# Patient Record
Sex: FEMALE | Race: WHITE | NOT HISPANIC OR LATINO | Employment: FULL TIME | URBAN - METROPOLITAN AREA
[De-identification: names, ages, dates, MRNs, and addresses within clinical notes are randomized per-mention and may not be internally consistent; named-entity substitution may affect disease eponyms.]

---

## 2017-03-02 ENCOUNTER — PATIENT MESSAGE (OUTPATIENT)
Dept: OPHTHALMOLOGY | Facility: CLINIC | Age: 57
End: 2017-03-02

## 2017-03-13 ENCOUNTER — OFFICE VISIT (OUTPATIENT)
Dept: OPHTHALMOLOGY | Facility: CLINIC | Age: 57
End: 2017-03-13
Payer: COMMERCIAL

## 2017-03-13 DIAGNOSIS — Z46.0 CONTACT LENS/GLASSES FITTING: Primary | ICD-10-CM

## 2017-03-13 PROCEDURE — 99499 UNLISTED E&M SERVICE: CPT | Mod: S$GLB,,, | Performed by: OPTOMETRIST

## 2017-03-13 PROCEDURE — 99999 PR PBB SHADOW E&M-EST. PATIENT-LVL III: CPT | Mod: PBBFAC,,, | Performed by: OPTOMETRIST

## 2017-03-13 RX ORDER — VITAMIN B COMPLEX
1 CAPSULE ORAL
COMMUNITY
Start: 2016-09-27 | End: 2017-03-13

## 2017-03-13 RX ORDER — BENZONATATE 100 MG/1
CAPSULE ORAL
COMMUNITY
Start: 2017-02-15 | End: 2017-03-13

## 2017-03-13 NOTE — PROGRESS NOTES
HPI     Problems with glasses rx    Additional comments: getting dizzy and having neck aches           Comments   Pt got her new progressive glasses in October. She has been trying them   out and they are making her dizzy. She has to move her head around to see   near, which hurts her neck. Here to have prescription checked.  Cannot   wear them longer than 20 minutes. Work well for driving at night.   Headaches with computer work    Dm SINCE 2005  NO DBR  Pciol ou  yag od 5/21/14  Myopia os       Last edited by Annmarie Justin MA on 3/13/2017 10:37 AM. (History)            Assessment /Plan     For exam results, see Encounter Report.    Contact lens/glasses fitting      Prescription of glasses is good, but there are problems with the PAL's positioning in the frame.  Sent to optical.

## 2017-03-13 NOTE — MR AVS SNAPSHOT
[facility] - Ophthalmology  [address] [facility] [address] [place] [address]  Phone: [phone]  Fax: [phone]                  Courtney Latif   3/13/2017 10:30 AM   Office Visit    Description:  Female : 1960   Provider:  Tasia Ashton OD   Department:  [facility]           Reason for Visit     Problems with glasses rx           Diagnoses this Visit        Comments    Contact lens/glasses fitting    -  Primary            To Do List           Goals (5 Years of Data)     None      [facility]     [facility] On Call Nurse Care Line -  Assistance  Registered nurses in the [facility] On Call Center provide clinical advisement, health education, appointment booking, and other advisory services.  Call for this free service at [phone].             Medications           Message regarding Medications     Verify the changes and/or additions to your medication regime listed below are the same as discussed with your clinician today.  If any of these changes or additions are incorrect, please notify your healthcare provider.        STOP taking these medications     b complex vitamins capsule Take 1 capsule by mouth.    benzonatate (TESSALON) 100 MG capsule 2 by mouth every 8 hours as needed for moderate to severe cough    cefdinir (OMNICEF) 300 MG capsule     ciprofloxacin (CIPRO) 500 MG tablet     meloxicam (MOBIC) 15 MG tablet            Verify that the below list of medications is an accurate representation of the medications you are currently taking.  If none reported, the list may be blank. If incorrect, please contact your healthcare provider. Carry this list with you in case of emergency.           Current Medications     amlodipine-benazepril 5-20 mg (LOTREL) 5-20 mg per capsule Take 1 capsule by mouth.    aspirin (ECOTRIN) 325 MG EC tablet Take 325 mg by mouth.    cyclobenzaprine (FLEXERIL) 10 MG tablet Take 10 mg by mouth.    fluoxetine (PROZAC) 20 MG capsule Take 20 mg by mouth.     hydrochlorothiazide (HYDRODIURIL) 25 MG tablet     lorazepam (ATIVAN) 0.5 MG tablet Take 0.5 mg by mouth.    metformin (GLUCOPHAGE-XR) 500 MG 24 hr tablet     nebivolol (BYSTOLIC) 5 MG Tab Take 5 mg by mouth.    nitrofurantoin (MACRODANTIN) 50 MG capsule     polyethylene glycol (MIRALAX) 17 gram/dose powder Take 17 g by mouth.    pravastatin (PRAVACHOL) 10 MG tablet     rabeprazole (ACIPHEX) 20 mg tablet Take 20 mg by mouth.    trazodone (DESYREL) 50 MG tablet     valacyclovir (VALTREX) 1000 MG tablet            Clinical Reference Information           Allergies as of 3/13/2017     Atorvastatin    Rosuvastatin      Immunizations Administered on Date of Encounter - 3/13/2017     None      Language Assistance Services     ATTENTION: Language assistance services are available, free of charge. Please call [phone].      ATENCIÓN: Si habla karyn, tiene a beckford disposición servicios gratuitos de asistencia lingüística. Llame al [phone].     CHÚ Ý: N?u b?n nói Ti?ng Vi?t, có các d?ch v? h? tr? ngôn ng? mi?n phí dành cho b?n. G?i s? [phone].         [facility] complies with applicable Federal civil rights laws and does not discriminate on the basis of race, color, national origin, age, disability, or sex.

## 2017-07-03 ENCOUNTER — OFFICE VISIT (OUTPATIENT)
Dept: OPHTHALMOLOGY | Facility: CLINIC | Age: 57
End: 2017-07-03
Payer: COMMERCIAL

## 2017-07-03 DIAGNOSIS — E11.9 TYPE 2 DIABETES MELLITUS WITHOUT COMPLICATION, WITHOUT LONG-TERM CURRENT USE OF INSULIN: Primary | ICD-10-CM

## 2017-07-03 DIAGNOSIS — Z96.1 LENS REPLACED BY OTHER MEANS: ICD-10-CM

## 2017-07-03 DIAGNOSIS — H35.54 PATTERN DYSTROPHY OF MACULA: ICD-10-CM

## 2017-07-03 PROBLEM — M54.9 CHRONIC BACK PAIN: Status: ACTIVE | Noted: 2017-07-03

## 2017-07-03 PROBLEM — G89.29 CHRONIC BACK PAIN: Status: ACTIVE | Noted: 2017-07-03

## 2017-07-03 PROCEDURE — 92014 COMPRE OPH EXAM EST PT 1/>: CPT | Mod: S$GLB,,, | Performed by: OPHTHALMOLOGY

## 2017-07-03 PROCEDURE — 99999 PR PBB SHADOW E&M-EST. PATIENT-LVL II: CPT | Mod: PBBFAC,,, | Performed by: OPHTHALMOLOGY

## 2017-07-03 RX ORDER — SPIRONOLACTONE 100 MG/1
TABLET, FILM COATED ORAL
COMMUNITY
Start: 2017-05-24 | End: 2018-11-06

## 2017-07-03 NOTE — PROGRESS NOTES
===============================  07/03/2017   Courtney Latif,   56 y.o. female   Last visit [facility]: :6/28/2016   Last visit eye dept. 3/13/2017  [facility]:  Uncorrected distance visual acuity was 20/20 in the right eye and not recorded in the left eye. Uncorrected near visual acuity was not recorded in the right eye and J1 in the left eye.  Tonometry     Tonometry (Applanation, 9:27 AM)       Right Left    Pressure 14 14               Not recorded        Manifest Refraction     Manifest Refraction       Sphere Cylinder Axis Dist [facility]    Right -0.50 +0.50 090 20/20    Left -2.25 +0.50 025 20/20              Chief Complaint   Patient presents with    Diabetic Eye Exam        HPI     Pt here for yearly DM exam. Pt states that she notices a bit of waviness   in her right eye, like if someone were waving a net in front of her eye.   No pain or discomfort. Blood sugar stable. Latest A1C 6.5.     Dm SINCE 2005  NO DBR  Pciol ou  yag od 5/21/14  Myopia os    Last edited by Larry Guardado, Patient Care Assistant on 7/3/2017  9:16   AM. (History)      Read Studies:   Vitals  ________________  7/3/2017  Problem List Items Addressed This Visit        Eye/Vision problems    Type 2 diabetes mellitus without complication - Primary    Lens replaced by other means    Pattern dystrophy of macula      Other Visit Diagnoses    None.       DM, no BDR  PCIOL OU looks great  Pattern dystrophy stable  rtc 1 year.  .       ===========================

## 2018-11-06 ENCOUNTER — OFFICE VISIT (OUTPATIENT)
Dept: OPHTHALMOLOGY | Facility: CLINIC | Age: 58
End: 2018-11-06
Payer: COMMERCIAL

## 2018-11-06 DIAGNOSIS — H35.54 PATTERN DYSTROPHY OF MACULA: ICD-10-CM

## 2018-11-06 DIAGNOSIS — E11.9 TYPE 2 DIABETES MELLITUS WITHOUT COMPLICATION, WITHOUT LONG-TERM CURRENT USE OF INSULIN: Primary | ICD-10-CM

## 2018-11-06 DIAGNOSIS — Z96.1 PSEUDOPHAKIA: ICD-10-CM

## 2018-11-06 PROCEDURE — 99999 PR PBB SHADOW E&M-EST. PATIENT-LVL II: CPT | Mod: PBBFAC,,, | Performed by: OPHTHALMOLOGY

## 2018-11-06 PROCEDURE — 92014 COMPRE OPH EXAM EST PT 1/>: CPT | Mod: S$GLB,,, | Performed by: OPHTHALMOLOGY

## 2018-11-06 RX ORDER — CALCIUM CARBONATE/VITAMIN D3 600MG-5MCG
TABLET ORAL
COMMUNITY

## 2018-11-06 RX ORDER — CLOTRIMAZOLE AND BETAMETHASONE DIPROPIONATE 10; .64 MG/G; MG/G
CREAM TOPICAL
COMMUNITY
Start: 2015-10-20 | End: 2021-01-05

## 2018-11-06 NOTE — PROGRESS NOTES
===============================  11/06/2018   Courtney Latif,   58 y.o. female   Last visit [facility]: :7/3/2017   Last visit eye dept. Visit date not found  [facility]:  Uncorrected distance visual acuity was 20/20 in the right eye and not recorded in the left eye. Uncorrected near visual acuity was not recorded in the right eye and J1 in the left eye.  Tonometry     Tonometry (Applanation, 8:47 AM)       Right Left    Pressure 15 16               Not recorded         Not recorded        Chief Complaint   Patient presents with    Diabetes     yearly diabetic exam        HPI     Diabetes      Additional comments: yearly diabetic exam              Comments     Dm SINCE 2005  NO DBR  Pciol ou  yag od 5/21/14  Myopia os          Last edited by Daria Noe on 11/6/2018  8:38 AM. (History)          ________________  11/6/2018  Problem List Items Addressed This Visit        Eye/Vision problems    Type 2 diabetes mellitus without complication - Primary  No BDR    Pseudophakia    Pattern dystrophy of macula    Overview     --lacquer crack maculopathy               .rtc 1year       ===========================

## 2019-12-06 ENCOUNTER — OFFICE VISIT (OUTPATIENT)
Dept: OPHTHALMOLOGY | Facility: CLINIC | Age: 59
End: 2019-12-06
Payer: COMMERCIAL

## 2019-12-06 DIAGNOSIS — E11.9 TYPE 2 DIABETES MELLITUS WITHOUT COMPLICATION, WITHOUT LONG-TERM CURRENT USE OF INSULIN: Primary | ICD-10-CM

## 2019-12-06 DIAGNOSIS — H35.54 PATTERN DYSTROPHY OF MACULA: ICD-10-CM

## 2019-12-06 PROCEDURE — 99999 PR PBB SHADOW E&M-EST. PATIENT-LVL II: CPT | Mod: PBBFAC,,, | Performed by: OPHTHALMOLOGY

## 2019-12-06 PROCEDURE — 99999 PR PBB SHADOW E&M-EST. PATIENT-LVL II: ICD-10-PCS | Mod: PBBFAC,,, | Performed by: OPHTHALMOLOGY

## 2019-12-06 PROCEDURE — 92014 PR EYE EXAM, EST PATIENT,COMPREHESV: ICD-10-PCS | Mod: S$GLB,,, | Performed by: OPHTHALMOLOGY

## 2019-12-06 PROCEDURE — 92014 COMPRE OPH EXAM EST PT 1/>: CPT | Mod: S$GLB,,, | Performed by: OPHTHALMOLOGY

## 2019-12-06 NOTE — PROGRESS NOTES
===============================  Courtney Latif,   59 y.o. female   Last visit [facility]: :11/6/2018   Last visit eye dept. Visit date not found  [facility]:  Uncorrected distance visual acuity was 20/20 in the right eye and not recorded in the left eye. Uncorrected near visual acuity was not recorded in the right eye and J1 in the left eye.  Tonometry     Tonometry (Applanation, 2:26 PM)       Right Left    Pressure 17 19               Not recorded         Not recorded         Not recorded        Chief Complaint   Patient presents with    Diabetic Eye Exam     here for dm eye exam, no c.o    lacquer crack          ________________  12/6/2019  HPI     Diabetic Eye Exam      Additional comments: here for dm eye exam, no c.o              Comments       Dm SINCE 2005  NO DBR  Pciol ou  yag od 5/21/14  Myopia os with lacquer crack          Last edited by NOEMY Walton on 12/6/2019  2:24 PM. (History)      Problem List Items Addressed This Visit        Eye/Vision problems    Type 2 diabetes mellitus without complication - Primary    Pattern dystrophy of macula    Overview     --lacquer crack maculopathy               .stable  rtc 1 year       ===========================

## 2021-01-05 ENCOUNTER — OFFICE VISIT (OUTPATIENT)
Dept: OPHTHALMOLOGY | Facility: CLINIC | Age: 61
End: 2021-01-05
Payer: COMMERCIAL

## 2021-01-05 DIAGNOSIS — H35.54 PATTERN DYSTROPHY OF MACULA: ICD-10-CM

## 2021-01-05 DIAGNOSIS — H43.811 POSTERIOR VITREOUS DETACHMENT OF RIGHT EYE: ICD-10-CM

## 2021-01-05 DIAGNOSIS — E11.9 TYPE 2 DIABETES MELLITUS WITHOUT COMPLICATION, WITHOUT LONG-TERM CURRENT USE OF INSULIN: Primary | ICD-10-CM

## 2021-01-05 PROCEDURE — 92134 CPTRZ OPH DX IMG PST SGM RTA: CPT | Mod: S$GLB,,, | Performed by: OPHTHALMOLOGY

## 2021-01-05 PROCEDURE — 92014 PR EYE EXAM, EST PATIENT,COMPREHESV: ICD-10-PCS | Mod: S$GLB,,, | Performed by: OPHTHALMOLOGY

## 2021-01-05 PROCEDURE — 99999 PR PBB SHADOW E&M-EST. PATIENT-LVL III: CPT | Mod: PBBFAC,,, | Performed by: OPHTHALMOLOGY

## 2021-01-05 PROCEDURE — 92134 POSTERIOR SEGMENT OCT RETINA (OCULAR COHERENCE TOMOGRAPHY)-BOTH EYES: ICD-10-PCS | Mod: S$GLB,,, | Performed by: OPHTHALMOLOGY

## 2021-01-05 PROCEDURE — 92014 COMPRE OPH EXAM EST PT 1/>: CPT | Mod: S$GLB,,, | Performed by: OPHTHALMOLOGY

## 2021-01-05 PROCEDURE — 99999 PR PBB SHADOW E&M-EST. PATIENT-LVL III: ICD-10-PCS | Mod: PBBFAC,,, | Performed by: OPHTHALMOLOGY

## 2021-01-05 RX ORDER — MELOXICAM 15 MG/1
TABLET ORAL
COMMUNITY
Start: 2020-12-27

## 2021-01-05 RX ORDER — NITROFURANTOIN 25; 75 MG/1; MG/1
100 CAPSULE ORAL
COMMUNITY
End: 2023-06-09

## 2021-01-05 RX ORDER — ESTRADIOL 0.1 MG/G
CREAM VAGINAL
COMMUNITY
Start: 2020-12-28

## 2021-01-05 RX ORDER — GABAPENTIN 300 MG/1
CAPSULE ORAL
COMMUNITY
Start: 2020-12-10

## 2021-05-06 ENCOUNTER — PATIENT MESSAGE (OUTPATIENT)
Dept: RESEARCH | Facility: HOSPITAL | Age: 61
End: 2021-05-06

## 2022-06-06 ENCOUNTER — OFFICE VISIT (OUTPATIENT)
Dept: OPHTHALMOLOGY | Facility: CLINIC | Age: 62
End: 2022-06-06
Payer: COMMERCIAL

## 2022-06-06 DIAGNOSIS — H35.54 PATTERN DYSTROPHY OF MACULA: ICD-10-CM

## 2022-06-06 DIAGNOSIS — E11.3291 TYPE 2 DIABETES MELLITUS WITH RIGHT EYE AFFECTED BY MILD NONPROLIFERATIVE RETINOPATHY WITHOUT MACULAR EDEMA, WITHOUT LONG-TERM CURRENT USE OF INSULIN: Primary | ICD-10-CM

## 2022-06-06 DIAGNOSIS — Z96.1 PSEUDOPHAKIA: ICD-10-CM

## 2022-06-06 DIAGNOSIS — E11.9 TYPE 2 DIABETES MELLITUS WITHOUT COMPLICATION, WITHOUT LONG-TERM CURRENT USE OF INSULIN: ICD-10-CM

## 2022-06-06 PROCEDURE — 99999 PR PBB SHADOW E&M-EST. PATIENT-LVL II: ICD-10-PCS | Mod: PBBFAC,,, | Performed by: OPHTHALMOLOGY

## 2022-06-06 PROCEDURE — 92014 PR EYE EXAM, EST PATIENT,COMPREHESV: ICD-10-PCS | Mod: S$GLB,,, | Performed by: OPHTHALMOLOGY

## 2022-06-06 PROCEDURE — 92134 CPTRZ OPH DX IMG PST SGM RTA: CPT | Mod: S$GLB,,, | Performed by: OPHTHALMOLOGY

## 2022-06-06 PROCEDURE — 92134 POSTERIOR SEGMENT OCT RETINA (OCULAR COHERENCE TOMOGRAPHY)-BOTH EYES: ICD-10-PCS | Mod: S$GLB,,, | Performed by: OPHTHALMOLOGY

## 2022-06-06 PROCEDURE — 92014 COMPRE OPH EXAM EST PT 1/>: CPT | Mod: S$GLB,,, | Performed by: OPHTHALMOLOGY

## 2022-06-06 PROCEDURE — 99999 PR PBB SHADOW E&M-EST. PATIENT-LVL II: CPT | Mod: PBBFAC,,, | Performed by: OPHTHALMOLOGY

## 2022-06-06 RX ORDER — LEVOTHYROXINE SODIUM 25 UG/1
25 TABLET ORAL DAILY
COMMUNITY
Start: 2022-06-04

## 2023-06-09 ENCOUNTER — OFFICE VISIT (OUTPATIENT)
Dept: OPHTHALMOLOGY | Facility: CLINIC | Age: 63
End: 2023-06-09
Payer: COMMERCIAL

## 2023-06-09 DIAGNOSIS — E11.3291 TYPE 2 DIABETES MELLITUS WITH RIGHT EYE AFFECTED BY MILD NONPROLIFERATIVE RETINOPATHY WITHOUT MACULAR EDEMA, WITHOUT LONG-TERM CURRENT USE OF INSULIN: Primary | ICD-10-CM

## 2023-06-09 DIAGNOSIS — Z96.1 PSEUDOPHAKIA: ICD-10-CM

## 2023-06-09 DIAGNOSIS — E11.9 TYPE 2 DIABETES MELLITUS WITHOUT COMPLICATION, WITHOUT LONG-TERM CURRENT USE OF INSULIN: ICD-10-CM

## 2023-06-09 PROCEDURE — 99999 PR PBB SHADOW E&M-EST. PATIENT-LVL III: CPT | Mod: PBBFAC,,, | Performed by: OPHTHALMOLOGY

## 2023-06-09 PROCEDURE — 92134 CPTRZ OPH DX IMG PST SGM RTA: CPT | Mod: S$GLB,,, | Performed by: OPHTHALMOLOGY

## 2023-06-09 PROCEDURE — 92014 COMPRE OPH EXAM EST PT 1/>: CPT | Mod: S$GLB,,, | Performed by: OPHTHALMOLOGY

## 2023-06-09 PROCEDURE — 92014 PR EYE EXAM, EST PATIENT,COMPREHESV: ICD-10-PCS | Mod: S$GLB,,, | Performed by: OPHTHALMOLOGY

## 2023-06-09 PROCEDURE — 92134 POSTERIOR SEGMENT OCT RETINA (OCULAR COHERENCE TOMOGRAPHY)-BOTH EYES: ICD-10-PCS | Mod: S$GLB,,, | Performed by: OPHTHALMOLOGY

## 2023-06-09 PROCEDURE — 99999 PR PBB SHADOW E&M-EST. PATIENT-LVL III: ICD-10-PCS | Mod: PBBFAC,,, | Performed by: OPHTHALMOLOGY

## 2023-06-09 NOTE — PROGRESS NOTES
===============================  Date today is 6/9/2023  Courtney Latif is a 62 y.o. female  Last visit [facility]: :6/6/2022   Last visit eye dept. Visit date not found    Uncorrected distance visual acuity was 20/20 in the right eye and not recorded in the left eye. Uncorrected near visual acuity was not recorded in the right eye and J1 in the left eye.  Tonometry       Tonometry (Applanation, 11:00 AM)         Right Left    Pressure 17 17                  Not recorded       Not recorded       Not recorded       Chief Complaint   Patient presents with    Diabetic Eye Exam     Pt here for annual DM exam. No pain or discomfort. VA stable.      HPI     Diabetic Eye Exam     Additional comments: Pt here for annual DM exam. No pain or discomfort.   VA stable.            Comments    Dm SINCE 2005  NO DBR  Pciol ou  yag od 5/21/14  Myopia os with lacquer crack            Last edited by Larry Guardado MA on 6/9/2023 10:57 AM.      Problem List Items Addressed This Visit          Eye/Vision problems    Type 2 diabetes mellitus without complication    Relevant Orders    Posterior Segment OCT Retina-Both eyes (Completed)    Pseudophakia     Other Visit Diagnoses       Type 2 diabetes mellitus with right eye affected by mild nonproliferative retinopathy without macular edema, without long-term current use of insulin    -  Primary    Relevant Orders    Posterior Segment OCT Retina-Both eyes (Completed)          Instructed to call 24/7 for any worsening of vision, visual distortion or pain.  Check OU independently daily.    Gave my office and personal cell phone number.  ________________  6/9/2023 today  Courtney Latif      DM no active retinopathy  OCT stable  PCIOL OU with YAG OD  No macular degeneration   No glaucoma    RTC 1 year  Instructed to call 24/7 for any worsening of vision or symptoms. Check OU daily.   Gave my office and cell phone number.    =============================

## 2024-06-20 NOTE — PROGRESS NOTES
===============================  Date today is 6/6/2022  Courtney Latif is a 61 y.o. female  Last visit [facility]: :1/5/2021   Last visit eye dept. Visit date not found    Uncorrected distance visual acuity was 20/20 in the right eye and not recorded in the left eye. Uncorrected near visual acuity was not recorded in the right eye and J1 in the left eye.  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 Check OU daily.   Gave my office and cell phone number.      .      ===============================       Update History & Physical    The patient's History and Physical of 6/3/24 was reviewed with the patient and I examined the patient. There was no change. The surgical site was confirmed by the patient and me.     Plan: The risks, benefits, expected outcome, and alternative to the recommended procedure have been discussed with the patient. Patient understands and wants to proceed with the procedure.     Electronically signed by Messi Mccauley MD on 6/20/2024 at 12:19 PM

## 2024-09-30 ENCOUNTER — OFFICE VISIT (OUTPATIENT)
Dept: OPHTHALMOLOGY | Facility: CLINIC | Age: 64
End: 2024-09-30

## 2024-09-30 DIAGNOSIS — Z96.1 PSEUDOPHAKIA: ICD-10-CM

## 2024-09-30 DIAGNOSIS — E11.3291 TYPE 2 DIABETES MELLITUS WITH RIGHT EYE AFFECTED BY MILD NONPROLIFERATIVE RETINOPATHY WITHOUT MACULAR EDEMA, WITHOUT LONG-TERM CURRENT USE OF INSULIN: Primary | ICD-10-CM

## 2024-09-30 PROCEDURE — 99213 OFFICE O/P EST LOW 20 MIN: CPT | Mod: PBBFAC,25 | Performed by: OPHTHALMOLOGY

## 2024-09-30 PROCEDURE — 99999 PR PBB SHADOW E&M-EST. PATIENT-LVL III: CPT | Mod: PBBFAC,,, | Performed by: OPHTHALMOLOGY

## 2024-09-30 PROCEDURE — 92012 INTRM OPH EXAM EST PATIENT: CPT | Mod: S$PBB,,, | Performed by: OPHTHALMOLOGY

## 2024-09-30 PROCEDURE — 92134 CPTRZ OPH DX IMG PST SGM RTA: CPT | Mod: PBBFAC | Performed by: OPHTHALMOLOGY

## 2024-09-30 RX ORDER — TRAMADOL HYDROCHLORIDE 50 MG/1
50 TABLET ORAL EVERY 8 HOURS PRN
COMMUNITY
Start: 2024-02-05

## 2024-09-30 RX ORDER — FERROUS SULFATE 325(65) MG
325 TABLET ORAL DAILY
COMMUNITY

## 2024-09-30 NOTE — PROGRESS NOTES
===============================  Date today is 9/30/2024  Courtney Latif is a 64 y.o. female  Last visit [facility]: :Visit date not found   Last visit eye dept. Visit date not found    Uncorrected distance visual acuity was 20/20 in the right eye and -- in the left eye. Uncorrected near visual acuity was not recorded in the right eye and J1 in the left eye.  Tonometry       Tonometry (Applanation, 11:04 AM)         Right Left    Pressure 15 14                  Not recorded       Not recorded       Not recorded       Chief Complaint   Patient presents with    Diabetic Eye Exam     HPI    Dm SINCE 2005  NO DBR  Pciol ou  yag od 5/21/14  Myopia os with lacquer crack     Last edited by Na Barber on 9/30/2024 10:50 AM.      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  Check OU independently daily.    Gave my office and personal cell phone number.  ________________  9/30/2024 today  Courtney Latif    :  DM stable retinopathy  OCT no change  Pciol post YAG OD  Sharp disc OU  Macula looks good  OS lacquer crack maculopathy    RTC 1 year  Instructed to call 24/7 for any worsening of vision or symptoms. Check OU daily.   Gave my office and cell phone number.    =============================

## 2025-08-07 ENCOUNTER — PATIENT MESSAGE (OUTPATIENT)
Dept: RESEARCH | Facility: HOSPITAL | Age: 65
End: 2025-08-07
Payer: MEDICARE